# Patient Record
Sex: MALE | Race: ASIAN | NOT HISPANIC OR LATINO | URBAN - METROPOLITAN AREA
[De-identification: names, ages, dates, MRNs, and addresses within clinical notes are randomized per-mention and may not be internally consistent; named-entity substitution may affect disease eponyms.]

---

## 2018-11-09 ENCOUNTER — OUTPATIENT (OUTPATIENT)
Dept: OUTPATIENT SERVICES | Facility: HOSPITAL | Age: 58
LOS: 1 days | End: 2018-11-09
Payer: COMMERCIAL

## 2018-11-09 VITALS
TEMPERATURE: 98 F | DIASTOLIC BLOOD PRESSURE: 90 MMHG | OXYGEN SATURATION: 98 % | SYSTOLIC BLOOD PRESSURE: 156 MMHG | RESPIRATION RATE: 16 BRPM | WEIGHT: 164.91 LBS | HEART RATE: 71 BPM | HEIGHT: 67 IN

## 2018-11-09 VITALS
DIASTOLIC BLOOD PRESSURE: 79 MMHG | HEART RATE: 67 BPM | RESPIRATION RATE: 16 BRPM | OXYGEN SATURATION: 98 % | SYSTOLIC BLOOD PRESSURE: 126 MMHG

## 2018-11-09 DIAGNOSIS — H25.812 COMBINED FORMS OF AGE-RELATED CATARACT, LEFT EYE: ICD-10-CM

## 2018-11-09 PROCEDURE — V2632: CPT

## 2018-11-09 PROCEDURE — 66984 XCAPSL CTRC RMVL W/O ECP: CPT | Mod: LT

## 2021-07-05 NOTE — ASU PATIENT PROFILE, ADULT - PT NEEDS ASSIST
RT Inhaler-Nebulizer Bronchodilator Protocol Note    There is a bronchodilator order in the chart from a provider indicating to follow the RT Bronchodilator Protocol and there is an Initiate RT Bronchodilator Protocol order as well (see protocol at bottom of note). The findings from the last RT Protocol Assessment were as follows:  Smoking: >15 Pack years  Surgical Status: No surgery  Xray: Chronic changes  Respiratory Pattern: RR 12-20  Mental Status: Alert and Oriented  Breath Sounds: Diminished and/or crackles  Cough: Strong, productive  Activity Level: Walking unassisted  Oxygen Requirement: 29% or 3LNC - 5LNC/40%  Indication for Bronchodilator Therapy: On home bronchodilators (4x a day @ home)  Bronchodilator Assessment Score: 5    Aerosolized bronchodilator medication orders have been revised according to the RT Bronchodilator Protocol. Pt takes Duoneb Q4 while awake at home    RT Inhaler-Nebulizer Bronchodilator Protocol:    Respiratory Therapist to perform RT Therapy Protocol Assessment then follow the protocol. No Indications - adjust the frequency to every 6 hours PRN wheezing or bronchospasm, if no treatments needed after 48 hours then discontinue using Per Protocol order mode. If indication present, adjust the RT bronchodilator orders based on the Bronchodilator Assessment Score as follows:    0-6 - enter or revise RT bronchodilator order to Albuterol Inhaler order with frequency of every 2 hours PRN for wheezing or increased work of breathing using Per Protocol order mode. If Albuterol Inhaler not tolerated or not effective, then discontinue the Albuterol Inhaler order and enter Albuterol Nebulizer order with same frequency and PRN reasons. Repeat RT Therapy Protocol Assessment as needed.     7-10 - discontinue any other Inpatient aerosolized bronchodilator medication orders and enter or revise two Albuterol Inhaler orders, one with BID frequency and one with frequency of every 2 hours
no
